# Patient Record
Sex: MALE | Race: WHITE | Employment: OTHER | URBAN - METROPOLITAN AREA
[De-identification: names, ages, dates, MRNs, and addresses within clinical notes are randomized per-mention and may not be internally consistent; named-entity substitution may affect disease eponyms.]

---

## 2020-10-17 ENCOUNTER — APPOINTMENT (OUTPATIENT)
Dept: MRI IMAGING | Facility: CLINIC | Age: 70
End: 2020-10-17
Attending: PHYSICIAN ASSISTANT
Payer: COMMERCIAL

## 2020-10-17 ENCOUNTER — HOSPITAL ENCOUNTER (EMERGENCY)
Facility: CLINIC | Age: 70
Discharge: HOME | End: 2020-10-17
Attending: PHYSICIAN ASSISTANT | Admitting: PHYSICIAN ASSISTANT
Payer: COMMERCIAL

## 2020-10-17 VITALS
RESPIRATION RATE: 16 BRPM | OXYGEN SATURATION: 98 % | DIASTOLIC BLOOD PRESSURE: 97 MMHG | TEMPERATURE: 97.3 F | SYSTOLIC BLOOD PRESSURE: 153 MMHG | HEART RATE: 71 BPM

## 2020-10-17 DIAGNOSIS — H81.399 PERIPHERAL VERTIGO: ICD-10-CM

## 2020-10-17 LAB
ANION GAP SERPL CALCULATED.3IONS-SCNC: 8 MMOL/L (ref 3–14)
BASOPHILS # BLD AUTO: 0 10E9/L (ref 0–0.2)
BASOPHILS NFR BLD AUTO: 0.5 %
BUN SERPL-MCNC: 20 MG/DL (ref 7–30)
CALCIUM SERPL-MCNC: 8.8 MG/DL (ref 8.5–10.1)
CHLORIDE SERPL-SCNC: 104 MMOL/L (ref 94–109)
CO2 SERPL-SCNC: 29 MMOL/L (ref 20–32)
CREAT SERPL-MCNC: 0.8 MG/DL (ref 0.66–1.25)
DIFFERENTIAL METHOD BLD: NORMAL
EOSINOPHIL # BLD AUTO: 0 10E9/L (ref 0–0.7)
EOSINOPHIL NFR BLD AUTO: 0.3 %
ERYTHROCYTE [DISTWIDTH] IN BLOOD BY AUTOMATED COUNT: 12.9 % (ref 10–15)
GFR SERPL CREATININE-BSD FRML MDRD: >90 ML/MIN/{1.73_M2}
GLUCOSE SERPL-MCNC: 139 MG/DL (ref 70–99)
HCT VFR BLD AUTO: 41.7 % (ref 40–53)
HGB BLD-MCNC: 14.5 G/DL (ref 13.3–17.7)
IMM GRANULOCYTES # BLD: 0 10E9/L (ref 0–0.4)
IMM GRANULOCYTES NFR BLD: 0.3 %
LYMPHOCYTES # BLD AUTO: 0.8 10E9/L (ref 0.8–5.3)
LYMPHOCYTES NFR BLD AUTO: 12.4 %
MCH RBC QN AUTO: 32.9 PG (ref 26.5–33)
MCHC RBC AUTO-ENTMCNC: 34.8 G/DL (ref 31.5–36.5)
MCV RBC AUTO: 95 FL (ref 78–100)
MONOCYTES # BLD AUTO: 0.2 10E9/L (ref 0–1.3)
MONOCYTES NFR BLD AUTO: 3.5 %
NEUTROPHILS # BLD AUTO: 5 10E9/L (ref 1.6–8.3)
NEUTROPHILS NFR BLD AUTO: 83 %
NRBC # BLD AUTO: 0 10*3/UL
NRBC BLD AUTO-RTO: 0 /100
PLATELET # BLD AUTO: 206 10E9/L (ref 150–450)
POTASSIUM SERPL-SCNC: 3.8 MMOL/L (ref 3.4–5.3)
RBC # BLD AUTO: 4.41 10E12/L (ref 4.4–5.9)
SODIUM SERPL-SCNC: 141 MMOL/L (ref 133–144)
TROPONIN I SERPL-MCNC: <0.015 UG/L (ref 0–0.04)
WBC # BLD AUTO: 6.1 10E9/L (ref 4–11)

## 2020-10-17 PROCEDURE — 93005 ELECTROCARDIOGRAM TRACING: CPT

## 2020-10-17 PROCEDURE — 85025 COMPLETE CBC W/AUTO DIFF WBC: CPT | Performed by: PHYSICIAN ASSISTANT

## 2020-10-17 PROCEDURE — 99285 EMERGENCY DEPT VISIT HI MDM: CPT | Mod: 25

## 2020-10-17 PROCEDURE — 80048 BASIC METABOLIC PNL TOTAL CA: CPT | Performed by: PHYSICIAN ASSISTANT

## 2020-10-17 PROCEDURE — 84484 ASSAY OF TROPONIN QUANT: CPT | Performed by: PHYSICIAN ASSISTANT

## 2020-10-17 PROCEDURE — A9585 GADOBUTROL INJECTION: HCPCS | Performed by: PHYSICIAN ASSISTANT

## 2020-10-17 PROCEDURE — 255N000002 HC RX 255 OP 636: Performed by: PHYSICIAN ASSISTANT

## 2020-10-17 PROCEDURE — 70553 MRI BRAIN STEM W/O & W/DYE: CPT

## 2020-10-17 RX ORDER — ONDANSETRON 4 MG/1
4 TABLET, ORALLY DISINTEGRATING ORAL EVERY 6 HOURS PRN
Qty: 12 TABLET | Refills: 0 | Status: SHIPPED | OUTPATIENT
Start: 2020-10-17

## 2020-10-17 RX ORDER — GADOBUTROL 604.72 MG/ML
7.5 INJECTION INTRAVENOUS ONCE
Status: COMPLETED | OUTPATIENT
Start: 2020-10-17 | End: 2020-10-17

## 2020-10-17 RX ORDER — MECLIZINE HYDROCHLORIDE 25 MG/1
25 TABLET ORAL EVERY 6 HOURS PRN
Qty: 30 TABLET | Refills: 1 | Status: SHIPPED | OUTPATIENT
Start: 2020-10-17

## 2020-10-17 RX ADMIN — GADOBUTROL 7.5 ML: 604.72 INJECTION INTRAVENOUS at 16:15

## 2020-10-17 ASSESSMENT — ENCOUNTER SYMPTOMS
FEVER: 0
VOMITING: 1
NUMBNESS: 0
DIZZINESS: 1
SPEECH DIFFICULTY: 0
HEADACHES: 0
NECK PAIN: 0
BLOOD IN STOOL: 0

## 2020-10-17 NOTE — ED AVS SNAPSHOT
[facility] Emergency [facility]  [address]  [facility] [address]  Phone: [phone]  Fax: [phone]                                    Alvin Sommers   MRN: [id number]    Department: [facility] Emergency Dept   Date of Visit: 10/17/2020           After Visit Summary Signature Page    I have received my discharge instructions, and my questions have been answered. I have discussed any challenges I see with this plan with the nurse or doctor.    ..........................................................................................................................................  Patient/Patient Representative Signature      ..........................................................................................................................................  Patient Representative Print Name and Relationship to Patient    ..................................................               ................................................  Date                                   Time    ..........................................................................................................................................  Reviewed by Signature/Title    ...................................................              ..............................................  Date                                               Time          22EPIC Rev 08/18

## 2020-10-17 NOTE — ED PROVIDER NOTES
History     Chief Complaint:  Dizziness    HPI   Alvin Sommers is a 69 year old male with history of cancers and HTN who presents with dizziness. The patient went on a regular bike ride this morning at 1000 with his friends. The patient states after going up a hill and another mile, he developed dizziness when turning his head or looking down. He reports he rode up to the 20 mile aury until he decided to cut the bike ride short due to persisting dizziness any time he would turn his head. The patient states he went home and rested, calling his son-in-law while resting. The son-in-law suggested the patient to rest for an hour and then called one of his friends who works in an ED in [place]. The patient says the friend recommended the patient go into the ED due to his age prompting today's visit. During evaluation, the patient states having two episodes of emesis at home prior to coming to the ED. He denies visual change, slurred speech, head or neck pain, acute extremity numbness, fever, and bloody stools. He denies CP, shortness of breath, or back pain.  The patient notes having similar symptoms a long time ago found to be due to inner ear crystals. however, he did not have emesis during that incident.    Allergies:  No known drug allergies    Medications:    Xalatan  Timolol  Spectazole    Past Medical History:    Prostate cancer  Glaucoma  Basal cell carcinoma of skin  KASEY  Benign neoplasm of colon  HTN    Past Surgical History:    Lumbar disc surgery  MOHS surgery  Prostate surgery  Green teeth extraction    Family History:    Cataract  CAD  Glaucoma  Heart attack  High cholesterol  Stroke  Dementia  Macular degeneration    Social History:  Smoking status: Former - Quit 05/07/1968  Alcohol use: Yes  Drug use: No  Marital Status:   [2]     Review of Systems   Constitutional: Negative for fever.   Eyes: Negative for visual disturbance.   Gastrointestinal: Positive for vomiting. Negative for blood in  stool.   Musculoskeletal: Negative for neck pain.   Neurological: Positive for dizziness. Negative for speech difficulty, numbness and headaches.   All other systems reviewed and are negative.    Physical Exam     Patient Vitals for the past 24 hrs:   BP Temp Temp src Pulse Resp SpO2   10/17/20 1407 (!) 153/97 97.3  F (36.3  C) Temporal 71 16 98 %       Physical Exam  General: Alert and cooperative with exam. Resting comfortably on gurney  Head:  Scalp is NC/AT  Eyes:  Conjunctiva normal, PERRL  ENT:  The external nose and ears are normal.     TM's normal BL, no mastoid swelling or tenderness  Neck:  Normal range of motion without rigidity.  CV:  Regular rate and rhythm    No pathologic murmur, rubs, or gallops.  Resp:  Breath sounds are clear bilaterally.  No crackles, wheezes, rhonchi, stridor.    Non-labored, no retractions or accessory muscle use  Abdomen: Abdomen is soft, no distension, no tenderness, no masses. No peritoneal signs. No CVA tenderness.  MS:  No lower extremity edema or asymmetric calf swelling. Normal ROM in all joints without effusions.  Skin:  Warm and dry, No rash or lesions noted. 2+ peripheral pulses in all extremities  Neuro:  Alert and oriented x3.  No gross motor deficits.  No facial asymmetry.  5/5 strength BL in UE and LE, normal sensation.  Cranial nerves 2-12 intact.  Normal finger to nose and heel to shin testing.  Gait normal  Psych: Awake. Alert. Normal affect. Appropriate interactions.      Emergency Department Course   ECG (15:04:54):  Rate 68 bpm. MA interval 190. QRS duration 82. QT/QTc 408/433. P-R-T axes 73 55 35. NSR. Possible left atrial enlargement. Borderline ECG. Interpreted at 1512 by Jaimee Medeiros MD.    Imaging:  Radiographic findings were communicated with the patient who voiced understanding of the findings.    MR Brain wo & w Contrast  IMPRESSION:    1. No evidence of acute infarct, mass, hemorrhage, or herniation.  2. Mild diffuse parenchymal volume loss and  white matter changes  likely due to chronic microvascular ischemic disease.    As read by Radiology.    Laboratory:  CBC: WNL (WBC 6.1, HGB 14.5, )  BMP: Glucose 139 (H), o/w WNL (Creatinine 0.80)  1438 Troponin I <0.015       Interventions:  1615 Gadavist 7.5 mL IV    Emergency Department Course:  Past medical records, nursing notes, and vitals reviewed.  1432: I performed an exam of the patient and obtained history, as documented above.    EKG obtained in the ED, see results above.     IV was inserted and blood was drawn for laboratory testing, results above.    The patient was sent for a brain MRI while in the emergency department, findings above.     1638: I rechecked the patient. Findings and plan explained to the Patient. Patient discharged home with instructions regarding supportive care, medications, and reasons to return. The importance of close follow-up was reviewed.     Impression & Plan    Medical Decision Makin yo male presents with vomiting and dizziness with head movements.  Broad differential considered.  MRI negative for central etiologies.  My impression is peripheral vertigo.  No evidence of ear infection.  EKG without evidence of ischemia or arrythmia.  Labs show normal hgb, wbc, platelets, electrolytes, kidney function.  Troponin undetectable.  No chest pain or shortness of breath to suggest ACS, PE, dissection and clearly describes vertigo symptoms.  Will prescribe Zofran, meclizine as needed for symptoms.  Follow-up with physical therapy as he felt this helped him in the past.  Return precautions given for new or worsening symptoms.  Able to ambulate while here in the ED prior to discharge.    Diagnosis:    ICD-10-CM    1. Peripheral vertigo  H81.399        Disposition:  Discharged home.    Discharge Medications:  New Prescriptions    No medications on file         Scribe Disclosure:  Karen CARDENAS, am serving as a scribe at 2:32 PM on 10/17/2020 to document services  personally performed by William Posey PA-C based on my observations and the provider's statements to me.    [facility] EMERGENCY DEPT       William Posey PA-C  10/17/20 [id number]

## 2020-10-17 NOTE — ED TRIAGE NOTES
Pt presents for concern of dizziness that has been on going throughout today. States it is worth with position changes and describes it as a feeling like the room is spinning around him. States he is nauseated with it and has thrown up several times. ABC intact, A&Ox4.

## 2020-10-19 LAB — INTERPRETATION ECG - MUSE: NORMAL
